# Patient Record
Sex: FEMALE | Race: WHITE | URBAN - METROPOLITAN AREA
[De-identification: names, ages, dates, MRNs, and addresses within clinical notes are randomized per-mention and may not be internally consistent; named-entity substitution may affect disease eponyms.]

---

## 2017-01-23 ENCOUNTER — THERAPY VISIT (OUTPATIENT)
Dept: PHYSICAL THERAPY | Facility: CLINIC | Age: 52
End: 2017-01-23
Payer: COMMERCIAL

## 2017-01-23 DIAGNOSIS — M25.512 ACUTE PAIN OF LEFT SHOULDER: ICD-10-CM

## 2017-01-23 DIAGNOSIS — S13.9XXA SPRAIN OF NECK, INITIAL ENCOUNTER: Primary | ICD-10-CM

## 2017-01-23 PROCEDURE — 97110 THERAPEUTIC EXERCISES: CPT | Mod: GP | Performed by: PHYSICAL THERAPIST

## 2017-01-23 PROCEDURE — 97140 MANUAL THERAPY 1/> REGIONS: CPT | Mod: GP | Performed by: PHYSICAL THERAPIST

## 2017-01-23 PROCEDURE — 97161 PT EVAL LOW COMPLEX 20 MIN: CPT | Mod: GP | Performed by: PHYSICAL THERAPIST
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
## 2017-02-01 PROBLEM — M25.512 ACUTE PAIN OF LEFT SHOULDER: Status: ACTIVE | Noted: 2017-02-01

## 2017-02-01 PROBLEM — S13.9XXA SPRAIN OF NECK, INITIAL ENCOUNTER: Status: ACTIVE | Noted: 2017-02-01

## 2017-02-01 ASSESSMENT — ENCOUNTER SYMPTOMS: NECK PAIN: 1
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
## 2017-02-03 ENCOUNTER — THERAPY VISIT (OUTPATIENT)
Dept: PHYSICAL THERAPY | Facility: CLINIC | Age: 52
End: 2017-02-03
Payer: COMMERCIAL

## 2017-02-03 DIAGNOSIS — S13.9XXA SPRAIN OF NECK, INITIAL ENCOUNTER: Primary | ICD-10-CM

## 2017-02-03 DIAGNOSIS — M25.512 ACUTE PAIN OF LEFT SHOULDER: ICD-10-CM

## 2017-02-03 PROCEDURE — 97140 MANUAL THERAPY 1/> REGIONS: CPT | Mod: GP | Performed by: PHYSICAL THERAPIST

## 2017-02-03 PROCEDURE — 97010 HOT OR COLD PACKS THERAPY: CPT | Mod: GP | Performed by: PHYSICAL THERAPIST

## 2017-02-03 PROCEDURE — 97110 THERAPEUTIC EXERCISES: CPT | Mod: GP | Performed by: PHYSICAL THERAPIST

## 2017-03-03 ENCOUNTER — THERAPY VISIT (OUTPATIENT)
Dept: PHYSICAL THERAPY | Facility: CLINIC | Age: 52
End: 2017-03-03
Payer: COMMERCIAL

## 2017-03-03 DIAGNOSIS — S13.9XXA SPRAIN OF NECK, INITIAL ENCOUNTER: Primary | ICD-10-CM

## 2017-03-03 DIAGNOSIS — M25.512 ACUTE PAIN OF LEFT SHOULDER: ICD-10-CM

## 2017-03-03 PROCEDURE — 97140 MANUAL THERAPY 1/> REGIONS: CPT | Mod: GP | Performed by: PHYSICAL THERAPIST

## 2017-03-03 PROCEDURE — 97112 NEUROMUSCULAR REEDUCATION: CPT | Mod: GP | Performed by: PHYSICAL THERAPIST

## 2017-03-03 PROCEDURE — 97110 THERAPEUTIC EXERCISES: CPT | Mod: GP | Performed by: PHYSICAL THERAPIST

## 2017-03-03 NOTE — LETTER
[facility]  [address]  [place] [address]  [phone]    April 10, 2017    Re: Dipti Chandler   :   1965  MRN:  [id number]   REFERRING PHYSICIAN:   [facility]    Date of Initial Evaluation:  17  Visits:  Rxs Used: 3  Reason for Referral:     Sprain of neck, initial encounter  Acute pain of left shoulder    PROGRESS  REPORT  Progress reporting period is from 17 to 3/3/17.     SUBJECTIVE  Subjective: Dipti notes that her L Shoulder is slightly improved without a painful arc, however, has a rough time sleeping on her L side at night. Also of note is L chronic UT pain that limits her neck mobility. Overall L shoulder pain is intermittant vs constant . Functionally is limited from sleeping through the night, laying on the L shoulder, lifting greater than 10#.    Current Pain level: 2/10   Initial Pain level: 9/10     Changes in function: Yes, see goal flow sheet for change in function   Adverse reactions: None    OBJECTIVE  Objective: Painful arc flexion is abolished, abduction roughly 130-140 degre arc of soreness. Palpation soreness to AC joint, cross body reach hurts the L shoulder. Primary complaint of L UT pain and tightness that sems to respond to Manual massage temporary. Recommend , assess global shoulder ROm every couple weeks, advance endurance strength fro RTC and scap.       ASSESSMENT/PLAN  Updated problem list and treatment plan: Diagnosis 1:  Neck pain     Diagnosis 2:  L shoulder ACJ Pain      STG/LTGs have been met or progress has been made towards goals:  Yes (See Goal flow sheet completed today.)  Assessment of Progress: The patient's condition is improving.  The patient's condition has potential to improve.  Self Management Plans:  Patient has been instructed in a home treatment program.    Recommendations:  This patient would benefit from continued therapy.     Frequency:  1-2 X a month, once daily  Duration:   for 3 months    Thank you for your referral.    INQUIRIES  Therapist: [facility]  [address]  [place] [address]  Phone: [phone]  Fax: [phone]

## 2017-04-09 NOTE — PROGRESS NOTES
Subjective:    HPI                    Objective:    System    Physical Exam    General     ROS    Assessment/Plan:      PROGRESS  REPORT    Progress reporting period is from 1/23/17 to 3/3/17.     SUBJECTIVE  Subjective: Dipti notes that her L Shoulder is slightly improved without a painful arc, however, has a rough time sleeping on her L side at night. Also of note is L chronic UT pain that limits her neck mobility. Overall L shoulder pain is intermittant vs constant . Functionally is limited from sleeping through the night, laying on the L shoulder, lifting greater than 10#.      Current Pain level: 2/10   Initial Pain level: 9/10     Changes in function: Yes, see goal flow sheet for change in function   Adverse reactions: None;   ,         OBJECTIVE  Objective: Painful arc flexion is abolished, abduction roughly 130-140 degre arc of soreness. Palpation soreness to AC joint, cross body reach hurts the L shoulder. Primary complaint of L UT pain and tightness that sems to respond to Manual massage temporary. Recommend , assess global shoulder ROm every couple weeks, advance endurance strength fro RTC and scap.       ASSESSMENT/PLAN  Updated problem list and treatment plan: Diagnosis 1:  Neck pain     Diagnosis 2:  L shoulder ACJ Pain      STG/LTGs have been met or progress has been made towards goals:  Yes (See Goal flow sheet completed today.)  Assessment of Progress: The patient's condition is improving.  The patient's condition has potential to improve.  Self Management Plans:  Patient has been instructed in a home treatment program.          Recommendations:  This patient would benefit from continued therapy.     Frequency:  1-2 X a month, once daily  Duration:  for 3 months        Please refer to the daily flowsheet for treatment today, total treatment time and time spent performing 1:1 timed codes.

## 2017-05-05 ENCOUNTER — THERAPY VISIT (OUTPATIENT)
Dept: PHYSICAL THERAPY | Facility: CLINIC | Age: 52
End: 2017-05-05
Payer: COMMERCIAL

## 2017-05-05 DIAGNOSIS — S13.9XXA SPRAIN OF NECK, INITIAL ENCOUNTER: ICD-10-CM

## 2017-05-05 DIAGNOSIS — M25.512 ACUTE PAIN OF LEFT SHOULDER: ICD-10-CM

## 2017-05-05 PROCEDURE — 97110 THERAPEUTIC EXERCISES: CPT | Mod: GP | Performed by: PHYSICAL THERAPIST

## 2017-05-05 PROCEDURE — 97140 MANUAL THERAPY 1/> REGIONS: CPT | Mod: GP | Performed by: PHYSICAL THERAPIST

## 2017-05-05 PROCEDURE — 97112 NEUROMUSCULAR REEDUCATION: CPT | Mod: GP | Performed by: PHYSICAL THERAPIST
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
## 2017-09-16 PROBLEM — M25.512 ACUTE PAIN OF LEFT SHOULDER: Status: RESOLVED | Noted: 2017-02-01 | Resolved: 2017-09-16

## 2017-09-16 PROBLEM — S13.9XXA SPRAIN OF NECK, INITIAL ENCOUNTER: Status: RESOLVED | Noted: 2017-02-01 | Resolved: 2017-09-16

## 2017-12-11 ENCOUNTER — THERAPY VISIT (OUTPATIENT)
Dept: OCCUPATIONAL THERAPY | Facility: CLINIC | Age: 52
End: 2017-12-11
Payer: COMMERCIAL

## 2017-12-11 DIAGNOSIS — G56.23 ULNAR NEUROPATHY OF BOTH UPPER EXTREMITIES: ICD-10-CM

## 2017-12-11 DIAGNOSIS — R20.2 PARESTHESIA OF BOTH HANDS: Primary | ICD-10-CM

## 2017-12-11 PROCEDURE — 97535 SELF CARE MNGMENT TRAINING: CPT | Mod: GO | Performed by: OCCUPATIONAL THERAPIST

## 2017-12-11 PROCEDURE — 97112 NEUROMUSCULAR REEDUCATION: CPT | Mod: GO | Performed by: OCCUPATIONAL THERAPIST

## 2017-12-11 PROCEDURE — 29105 APPLICATION LONG ARM SPLINT: CPT | Mod: GO | Performed by: OCCUPATIONAL THERAPIST

## 2017-12-11 PROCEDURE — 97165 OT EVAL LOW COMPLEX 30 MIN: CPT | Mod: GO | Performed by: OCCUPATIONAL THERAPIST

## 2017-12-11 NOTE — MR AVS SNAPSHOT
"              After Visit Summary   12/11/2017    Dipti Chandler    MRN: [id number]           Patient Information     Date Of Birth          1965        Visit Information        Provider Department      12/11/2017 11:30 AM [facility] [place]        Today's Diagnoses     Paresthesia of both hands    -  1    Ulnar neuropathy of both upper extremities           Follow-ups after your visit        Your next 10 appointments already scheduled     Feb 13, 2018  8:30 AM CST   (Arrive by 8:15 AM)   Return Visit with Jennifer Dickson MD   [facility] ([facility] [place])    [address] [place] [address] [phone]              Who to contact     If you have questions or need follow up information about today's clinic visit or your schedule please contact [facility] [place] directly at [phone].  Normal or non-critical lab and imaging results will be communicated to you by MyChart, letter or phone within 4 business days after the clinic has received the results. If you do not hear from us within 7 days, please contact the clinic through MyChart or phone. If you have a critical or abnormal lab result, we will notify you by phone as soon as possible.  Submit refill requests through [employer] or call your pharmacy and they will forward the refill request to us. Please allow 3 business days for your refill to be completed.          Additional Information About Your Visit        [employer] Information     [employer] lets you send messages to your doctor, view your test results, renew your prescriptions, schedule appointments and more. To sign up, go to [email or web address] . Click on \"Log in\" on the left side of the screen, which will take you to the Welcome page. Then click on \"Sign up Now\" on the right side of the page.     You will be asked to enter the access code listed " below, as well as some personal information. Please follow the directions to create your username and password.     Your access code is: [email or web address]  Expires: 3/11/2018 12:41 PM     Your access code will  in 90 days. If you need help or a new code, please call your [facility] clinic or [phone].        Care EveryWhere ID     This is your Care EveryWhere ID. This could be used by other organizations to access your [facility] medical records  [id number]         Blood Pressure from Last 3 Encounters:   No data found for BP    Weight from Last 3 Encounters:   No data found for Wt              We Performed the Following     APPLY LONG ARM SPLINT     HC OT EVAL, LOW COMPLEXITY     JOSE INITIAL EVAL REPORT     NEUROMUSCULAR RE-EDUCATION     SELF CARE MNGMENT TRAINING        Primary Care Provider Office Phone # Fax #    Andrey Alvarez [phone]       [facility] [address]  [facility] [address]        Equal Access to Services     MIRZA JERRY : Hadii aad ku hadasho Soomaali, waaxda luqadaha, qaybta kaalmada adeegyada, waxay idiin hayyonisn kasi landry . So [facility] [phone].    ATENCIÓN: Si habla español, tiene a dial disposición servicios gratuitos de asistencia lingüística. Llame al [phone].    We comply with applicable federal civil rights laws and [place] laws. We do not discriminate on the basis of race, color, national origin, age, disability, sex, sexual orientation, or gender identity.            Thank you!     Thank you for choosing [facility] [place] [facility]  for your care. Our goal is always to provide you with excellent care. Hearing back from our patients is one way we can continue to improve our services. Please take a few minutes to complete the written survey that you may receive in the mail after your visit with us. Thank you!             Your Updated Medication List - Protect others around you: Learn how to safely use,  store and throw away your medicines at www.disposemymeds.org.          This list is accurate as of: 12/11/17 12:41 PM.  Always use your most recent med list.                   Brand Name Dispense Instructions for use Diagnosis    ADVIL 200 MG tablet   Generic drug:  ibuprofen      Take 200 mg by mouth every 4 hours as needed.        ALLEGRA 180 MG tablet   Generic drug:  fexofenadine      Take 180 mg by mouth daily.        AMBIEN 5 MG tablet   Generic drug:  zolpidem      Take 5 mg by mouth nightly as needed.        fish oil-omega-3 fatty acids 1000 MG capsule      Take 21 g by mouth daily        FROVA 2.5 MG tablet   Generic drug:  frovatriptan      Take 2.5 mg by mouth at onset of headache.        methylphenidate 10 MG Cp24    RITALIN LA     Take 10 mg by mouth daily        METROGEL 1 % gel   Generic drug:  metroNIDAZOLE      Apply 0.5 inches topically daily. Apply and rub in a thin film to affected areas twice daily        nexIUM 20 MG CR capsule   Generic drug:  esomeprazole      Take 20 mg by mouth every morning (before breakfast). One hour before meals        PILOCARPINE HCL PO      Take 4 mg by mouth 3 times daily        ranitidine 75 MG tablet   Generic drug:  ranitidine      Take 75 mg by mouth 2 times daily.        RESTASIS 0.05 % ophthalmic emulsion   Generic drug:  cycloSPORINE      Apply 1 drop to eye every 12 hours.        Suvorexant 5 MG tablet    BELSOMRA     Take 10 mg by mouth nightly as needed        TYLENOL 8 HOUR PO      Take  by mouth.

## 2017-12-11 NOTE — LETTER
[facility] [place]  [address]  [place] [address]  [phone]    2017    Re: Dipti Chandler   :   1965  MRN:  [id number]   REFERRING PHYSICIAN:   Denny Wharton    [facility] [place]    Date of Initial Evaluation: 17  Visits:  Rxs Used: 1  Reason for Referral:     Paresthesia of both hands  Ulnar neuropathy of both upper extremities    EVALUATION SUMMARY    Hand Therapy Initial Evaluation    Current Date:  2017    Subjective:  Dipti Chandler is a 52 year old right hand dominant female Diagnosis:   Bilateral ulnar neuropathy at the elbows DOI:  17 (therapy referral)Patient reports symptoms of pain, weakness/loss of strength, numbness and tingling of bilateral hands and elbows which began 3 months ago due to recent increase in exercise for weight loss.  Had similar symptoms 12 years ago that resolved with ergonomic changes. Since onset symptoms are gradually getting worse.  Special tests:  EMG left only.  Previous treatment: Had splints 12 years ago that need to be replaced.  General health as reported by patient is excellent.  Pertinent medical history includes:weakness  Medical allergies: Vicodin, oxycodone.  Surgical history: orthopedic: L ankle, L shoulder, other: oophorectomy, cyst removal.  Medication history: hormone replacement, anti-inflammatory.    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  no intrinsic atrophy and no clawing of ulnar  digits.    Strength:   (Measured in pounds)   17    Trials Right Left   1  2  3 60  52  50 45  48  40   Average: 54 44     Dipti Chandler   :   1965        Lat Pinch 17    Trials Right Left   1  2  3 15  16  16 16  17  17   Average: 16 17     3 Pt Pinch 17    Trials Right Left   1  2  3 17  15  15 15  16  16   Average: 16 16     Sensation:  Decreased Ulnar Nerve distribution     Special Tests:  Pain Report:  - none    + mild    ++ moderate    +++ severe    17   Tinels at cubital tunnel R:+  L:++   Elbow Flexion Test R:-  L:-   Ulnar nerve subluxation at elbow R:-  L:-     Pain Report:  VAS(0-10) 17   At Rest: 0/10   With Use: 1/10   Location:  elbow  Pain Quality:  Tender  Frequency: intermittent    Pain is worst:  daytime  Exacerbated by:  Lean on elbow   Relieved by:  rest  Progression:  Unchanged  Assessment:  Patient presents with symptoms consistent with diagnosis of bilateral cubital tunnel, with conservative intervention.     Patient's limitations or Problem List includes:  Pain, Weakness, Sensory disturbance, Decreased  and Decreased pinch of bilateral elbows and hands which interferes with the patient's ability to perform Self Care Tasks (dressing), Sleep Patterns, Household Chores and Driving  as compared to previous level of function.    Rehab Potential:  Excellent - Return to full activity, no limitations    Patient will benefit from skilled Occupational Therapy to increase overall strength,  strength, pinch strength and sensation and decrease pain to return to previous activity level and resume normal daily tasks and to reach their rehab potential.    Barriers to Learning:  No barrier    Communication Issues:  Patient appears to be able to clearly communicate and understand verbal and written communication and follow directions correctly.    Chart Review: Simple history review with patient  Dipti Chandler   :   1965      Identified Performance  Deficits: dressing, driving and community mobility, home establishment and management, meal preparation and cleanup and sleep    Assessment of Occupational Performance:  5 or more Performance Deficits     Clinical Decision Making (Complexity): Low complexity    Treatment Explanation:  The following has been discussed with the patient:  RX ordered/plan of care  Anticipated outcomes  Possible risks and side effects    Plan:  Frequency:  1 X week, once daily  Duration:  for 1 week (NA, one time visit)    Treatment Plan:    Therapeutic Exercise:  AROM  Neuromuscular re-education:  Nerve Gliding, Posture and Kinesiotaping  Orthotic Fabrication:  Elbow  based orthosis  Self Care:  Self Care Tasks and Ergonomic Considerations    Discharge Plan:  Achieve all LTG.  Independent in home treatment program.  Reach maximal therapeutic benefit.    Home Exercise Program:  Postural awareness, avoid rounded shoulders  Diagnostic education for protection of ulnar nerve  Avoid prolonged elbow flexion and leaning on elbow  Elbow pad prn day  Elbow pad and flexion block orthoses sleeping  Chin tucks and shoulder blade squeezes  Pec stretches  Ulnar nerve flossing    Thank you for your referral.    INQUIRIES  Therapist: RESHMA Carson/FELA, CHT  [facility] [place]  [address]  [facility] [address]  Phone: [phone]  Fax: [phone]

## 2017-12-11 NOTE — PROGRESS NOTES
Hand Therapy Initial Evaluation    Current Date:  12/11/2017    Subjective:  Dipti Chandler is a 52 year old right hand dominant female.    Diagnosis:   Bilateral ulnar neuropathy at the elbows  DOI:  11/21/17 (therapy referral)    Patient reports symptoms of pain, weakness/loss of strength, numbness and tingling of bilateral hands and elbows which began 3 months ago due to recent increase in exercise for weight loss.  Had similar symptoms 12 years ago that resolved with ergonomic changes. Since onset symptoms are gradually getting worse.  Special tests:  EMG left only.  Previous treatment: Had splints 12 years ago that need to be replaced.  General health as reported by patient is excellent.  Pertinent medical history includes:weakness  Medical allergies: Vicodin, oxycodone.  Surgical history: orthopedic: L ankle, L shoulder, other: oophorectomy, cyst removal.  Medication history: hormone replacement, anti-inflammatory.    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  no intrinsic atrophy and no clawing of ulnar digits.    Strength:   (Measured in pounds)   12/11/17    Trials Right Left   1  2  3 60  52  50 45  48  40   Average: 54 44     Lat Pinch 12/11/17    Trials Right Left   1  2  3 15  16  16 16  17  17   Average: 16 17     3 Pt Pinch 12/11/17    Trials Right Left   1  2  3 17  15  15 15  16  16   Average: 16 16     Sensation:  Decreased Ulnar Nerve distribution     Special Tests:  Pain Report:  - none    + mild    ++ moderate    +++ severe    12/11/17   Tinels at cubital  tunnel R:+  L:++   Elbow Flexion Test R:-  L:-   Ulnar nerve subluxation at elbow R:-  L:-     Pain Report:  VAS(0-10) 12/11/17   At Rest: 0/10   With Use: 1/10   Location:  elbow  Pain Quality:  Tender  Frequency: intermittent    Pain is worst:  daytime  Exacerbated by:  Lean on elbow   Relieved by:  rest  Progression:  Unchanged  Assessment:  Patient presents with symptoms consistent with diagnosis of bilateral cubital tunnel, with conservative intervention.     Patient's limitations or Problem List includes:  Pain, Weakness, Sensory disturbance, Decreased  and Decreased pinch of bilateral elbows and hands which interferes with the patient's ability to perform Self Care Tasks (dressing), Sleep Patterns, Household Chores and Driving  as compared to previous level of function.    Rehab Potential:  Excellent - Return to full activity, no limitations    Patient will benefit from skilled Occupational Therapy to increase overall strength,  strength, pinch strength and sensation and decrease pain to return to previous activity level and resume normal daily tasks and to reach their rehab potential.    Barriers to Learning:  No barrier    Communication Issues:  Patient appears to be able to clearly communicate and understand verbal and written communication and follow directions correctly.    Chart Review: Simple history review with patient    Identified Performance Deficits: dressing, driving and community mobility, home establishment and management, meal preparation and cleanup and sleep    Assessment of Occupational Performance:  5 or more Performance Deficits     Clinical Decision Making (Complexity): Low complexity    Treatment Explanation:  The following has been discussed with the patient:  RX ordered/plan of care  Anticipated outcomes  Possible risks and side effects    Plan:  Frequency:  1 X week, once daily  Duration:  for 1 week (NA, one time visit)    Treatment Plan:    Therapeutic Exercise:   AROM  Neuromuscular re-education:  Nerve Gliding, Posture and Kinesiotaping  Orthotic Fabrication:  Elbow  based orthosis  Self Care:  Self Care Tasks and Ergonomic Considerations    Discharge Plan:  Achieve all LTG.  Independent in home treatment program.  Reach maximal therapeutic benefit.    Home Exercise Program:  Postural awareness, avoid rounded shoulders  Diagnostic education for protection of ulnar nerve  Avoid prolonged elbow flexion and leaning on elbow  Elbow pad prn day  Elbow pad and flexion block orthoses sleeping  Chin tucks and shoulder blade squeezes  Pec stretches  Ulnar nerve flossing

## 2018-02-13 ENCOUNTER — OFFICE VISIT (OUTPATIENT)
Dept: DERMATOLOGY | Facility: CLINIC | Age: 53
End: 2018-02-13
Payer: COMMERCIAL

## 2018-02-13 DIAGNOSIS — L64.9 ANDROGENETIC ALOPECIA: ICD-10-CM

## 2018-02-13 DIAGNOSIS — D18.01 CHERRY ANGIOMA: ICD-10-CM

## 2018-02-13 DIAGNOSIS — D22.9 MULTIPLE BENIGN MELANOCYTIC NEVI: Primary | ICD-10-CM

## 2018-02-13 DIAGNOSIS — Z86.018 H/O DYSPLASTIC NEVUS: ICD-10-CM

## 2018-02-13 ASSESSMENT — PAIN SCALES - GENERAL: PAINLEVEL: NO PAIN (0)

## 2018-02-13 NOTE — PROGRESS NOTES
[facility] Dermatology Note      Dermatology Problem List:  1.Dysplastic nevi. Multiple; mild-moderate.   2. AGA; laser comb/minoxidil 5%    Encounter Date: Feb 13, 2018    CC:   Chief Complaint   Patient presents with     Skin Check     Dipti is here for a skin check, there are no spots that are bothering her at this time.         History of Present Illness:  Ms. Dipit Chandler is a 52 year old female who presents today today in f/u for dysplastic nevi. Last seen 11/23/16, at which point she had a benign examination. She notes no changing or concerning moles. She has had stable hairloss. Using laser comb TIW. Using Minoxidil TIW as well as more frequent use is irritating to the scalp. No other skin issues or concerns today.       Past Medical History:   Patient Active Problem List   Diagnosis     Atypical nevi     CARDIOVASCULAR SCREENING; LDL GOAL LESS THAN 160     Skin exam, screening for cancer     Other postprocedural status(V45.89)     History reviewed. No pertinent past medical history.  Past Surgical History:   Procedure Laterality Date     BIOPSY OF SKIN LESION         Social History:  The patient grew up in [facility] [place] [facility]. Significant sun exposure hx; blistering sun burns.   Denies tanning bed use.       Medications:  Current Outpatient Prescriptions   Medication Sig Dispense Refill     methylphenidate (RITALIN LA) 10 MG CP24 Take 10 mg by mouth daily       Suvorexant (BELSOMRA) 5 MG tablet Take 10 mg by mouth nightly as needed       PILOCARPINE HCL PO Take 4 mg by mouth 3 times daily       fish oil-omega-3 fatty acids (FISH OIL) 1000 MG capsule Take 21 g by mouth daily       cycloSPORINE (RESTASIS) 0.05 % ophthalmic emulsion Apply 1 drop to eye every 12 hours.       ibuprofen (ADVIL) 200 MG tablet Take 200 mg by mouth every 4 hours as needed.       frovatriptan (FROVA) 2.5 MG tablet Take 2.5 mg by mouth at onset of headache.       MetroNIDazole (METROGEL) 1 % gel Apply 0.5 inches topically  daily. Apply and rub in a thin film to affected areas twice daily       esomeprazole (NEXIUM) 20 MG capsule Take 20 mg by mouth every morning (before breakfast). One hour before meals       Acetaminophen (TYLENOL 8 HOUR PO) Take  by mouth.       ranitidine (ZANTAC 75) 75 MG tablet Take 75 mg by mouth 2 times daily.       zolpidem (AMBIEN) 5 MG tablet Take 5 mg by mouth nightly as needed.       fexofenadine (ALLEGRA) 180 MG tablet Take 180 mg by mouth daily.          No Known Allergies      Review of Systems:  -As per HPI  -Constitutional: The patient denies fatigue, fevers, chills, and night sweats.   -HEENT: Patient denies additional nonhealing oral sores.  -Skin: As above in HPI. No additional skin concerns.    Physical exam:  Vitals: There were no vitals taken for this visit.  GEN: Well appearing, well nourished. No acute distress. Alert and oriented x3.   SKIN: Full skin, which includes the head/face, both arms, chest, back, abdomen,both legs, genitalia and/or groin buttocks, digits and/or nails, was examined.  -There are dome shaped bright red papules on the face trunk and extremities.   -Multiple regular brown pigmented macules and papules are identified on the face, trunk and extremities. All <5 mm. Reticular, benign networks.   -Well healed scars at previous biopsy sites.   -1.2 part width. No scale or erythema.   -Mild PIH on the left popliteal fossa.   -No other lesions of concern on areas examined.     Impression/Plan:  1.History of dysplastic nevi. Multiple benign nevi on exam today. NERD at previous biopsy sites. Reassurance provided/   -1 year FBSE advised. Sooner with changes.   -ABCDs of melanoma were discussed and self skin checks were advised. , Sun precaution was advised including the use of sun screens of SPF 30 or higher, sun protective clothing, and avoidance of tanning beds.    2. Cherry angiomata.   -Benign, reassurance. Nothing to do.     3. Androgenetic Alopecia. Stable from previous. No  scarring on examination.   -Continue with photo biomodulation TIW and minoxidil 5% foam.       Follow-up in 1 year, earlier for new or changing lesions.       Dr. Dickson staffed the patient.    Staff Involved:  Resident(José Luis Foreman)/Staff(as above)    José Luis Foreman MD  PGY3 Dermatology  [phone]   .I, Jennifer Dickson MD, saw this patient with the resident and agree with the resident s findings and plan of care as documented in the resident s note.

## 2018-02-13 NOTE — LETTER
2/13/2018       RE: Dipti Chandler  [address] [address]     Dear Colleague,    Thank you for referring your patient, Dipti Chandler, to the [facility] DERMATOLOGY at [facility]. Please see a copy of my visit note below.    [facility] Dermatology Note      Dermatology Problem List:  1.Dysplastic nevi. Multiple; mild-moderate.   2. AGA; laser comb/minoxidil 5%    Encounter Date: Feb 13, 2018    CC:   Chief Complaint   Patient presents with     Skin Check     Dipti is here for a skin check, there are no spots that are bothering her at this time.         History of Present Illness:  Ms. Dipti Chandler is a 52 year old female who presents today today in f/u for dysplastic nevi. Last seen 11/23/16, at which point she had a benign examination. She notes no changing or concerning moles. She has had stable hairloss. Using laser comb TIW. Using Minoxidil TIW as well as more frequent use is irritating to the scalp. No other skin issues or concerns today.       Past Medical History:   Patient Active Problem List   Diagnosis     Atypical nevi     CARDIOVASCULAR SCREENING; LDL GOAL LESS THAN 160     Skin exam, screening for cancer     Other postprocedural status(V45.89)     History reviewed. No pertinent past medical history.  Past Surgical History:   Procedure Laterality Date     BIOPSY OF SKIN LESION         Social History:  The patient grew up in [place] [facility]. Significant sun exposure hx; blistering sun burns.   Denies tanning bed use.       Medications:  Current Outpatient Prescriptions   Medication Sig Dispense Refill     methylphenidate (RITALIN LA) 10 MG CP24 Take 10 mg by mouth daily       Suvorexant (BELSOMRA) 5 MG tablet Take 10 mg by mouth nightly as needed       PILOCARPINE HCL PO Take 4 mg by mouth 3 times daily       fish oil-omega-3 fatty acids (FISH OIL) 1000 MG capsule Take 21 g by mouth daily       cycloSPORINE (RESTASIS) 0.05 %  ophthalmic emulsion Apply 1 drop to eye every 12 hours.       ibuprofen (ADVIL) 200 MG tablet Take 200 mg by mouth every 4 hours as needed.       frovatriptan (FROVA) 2.5 MG tablet Take 2.5 mg by mouth at onset of headache.       MetroNIDazole (METROGEL) 1 % gel Apply 0.5 inches topically daily. Apply and rub in a thin film to affected areas twice daily       esomeprazole (NEXIUM) 20 MG capsule Take 20 mg by mouth every morning (before breakfast). One hour before meals       Acetaminophen (TYLENOL 8 HOUR PO) Take  by mouth.       ranitidine (ZANTAC 75) 75 MG tablet Take 75 mg by mouth 2 times daily.       zolpidem (AMBIEN) 5 MG tablet Take 5 mg by mouth nightly as needed.       fexofenadine (ALLEGRA) 180 MG tablet Take 180 mg by mouth daily.          No Known Allergies      Review of Systems:  -As per HPI  -Constitutional: The patient denies fatigue, fevers, chills, and night sweats.   -HEENT: Patient denies additional nonhealing oral sores.  -Skin: As above in HPI. No additional skin concerns.    Physical exam:  Vitals: There were no vitals taken for this visit.  GEN: Well appearing, well nourished. No acute distress. Alert and oriented x3.   SKIN: Full skin, which includes the head/face, both arms, chest, back, abdomen,both legs, genitalia and/or groin buttocks, digits and/or nails, was examined.  -There are dome shaped bright red papules on the face trunk and extremities.   -Multiple regular brown pigmented macules and papules are identified on the face, trunk and extremities. All <5 mm. Reticular, benign networks.   -Well healed scars at previous biopsy sites.   -1.2 part width. No scale or erythema.   -Mild PIH on the left popliteal fossa.   -No other lesions of concern on areas examined.     Impression/Plan:  1.History of dysplastic nevi. Multiple benign nevi on exam today. NERD at previous biopsy sites. Reassurance provided/   -1 year FBSE advised. Sooner with changes.   -ABCDs of melanoma were discussed and  self skin checks were advised. , Sun precaution was advised including the use of sun screens of SPF 30 or higher, sun protective clothing, and avoidance of tanning beds.    2. Cherry angiomata.   -Benign, reassurance. Nothing to do.     3. Androgenetic Alopecia. Stable from previous. No scarring on examination.   -Continue with photo biomodulation TIW and minoxidil 5% foam.       Follow-up in 1 year, earlier for new or changing lesions.       Dr. Dickson staffed the patient.    Staff Involved:  Resident(José Luis Foreman)/Staff(as above)    José Luis Foreman MD  PGY3 Dermatology  [phone]   .I, Jennifer Dickson MD, saw this patient with the resident and agree with the resident s findings and plan of care as documented in the resident s note.

## 2018-02-13 NOTE — NURSING NOTE
Dermatology Rooming Note    Dipti Chandler's goals for this visit include:   Chief Complaint   Patient presents with     Skin Check     Dipti is here for a skin check, there are no spots that are bothering her at this time.     Beth Georges CMA

## 2018-02-13 NOTE — MR AVS SNAPSHOT
After Visit Summary   2/13/2018    Dipti Chandler    MRN: [id number]           Patient Information     Date Of Birth          1965        Visit Information        Provider Department      2/13/2018 8:30 AM Jennifer Dickson MD [facility] Dermatology        Today's Diagnoses     Multiple benign melanocytic nevi    -  1    H/O dysplastic nevus        Cherry angioma        Androgenetic alopecia          Care Instructions        The ABCDEs of Melanoma    Skin cancer can develop anywhere on the skin. Ask someone for help when checking your skin, especially in hard to see places. If you notice a mole different from others, or that changes, enlarges, itches, or bleeds (even if it is small), you should see a dermatologist.                              Follow-ups after your visit        Who to contact     Please call your clinic at [phone] to:    Ask questions about your health    Make or cancel appointments    Discuss your medicines    Learn about your test results    Speak to your doctor            Additional Information About Your Visit        MyChart Information     [employer] gives you secure access to your electronic health record. If you see a primary care provider, you can also send messages to your care team and make appointments. If you have questions, please call your primary care clinic.  If you do not have a primary care provider, please call [phone] and they will assist you.      [employer] is an electronic gateway that provides easy, online access to your medical records. With [employer], you can request a clinic appointment, read your test results, renew a prescription or communicate with your care team.     To access your existing account, please contact your [facility] or call [phone] for assistance.        Care EveryWhere ID     This is your Care EveryWhere ID. This could be used by other organizations to access your [facility]  records  [id number]         Blood Pressure from Last 3 Encounters:   No data found for BP    Weight from Last 3 Encounters:   No data found for Wt              Today, you had the following     No orders found for display       Primary Care Provider Office Phone # Fax #    Andrey Alvarez [phone]       [facility] [address]  [facility] [address]        Equal Access to Services     YAMILE JERRY : Hadii aad ku hadasho Soomaali, waaxda luqadaha, qaybta kaalmada adeegyada, waxay idiin hayaan adegladys sandhu laJohnnyaan . So [facility] [phone].    ATENCIÓN: Si habla español, tiene a dial disposición servicios gratuitos de asistencia lingüística. Eduardo al [phone].    We comply with applicable federal civil rights laws and [place] laws. We do not discriminate on the basis of race, color, national origin, age, disability, sex, sexual orientation, or gender identity.            Thank you!     Thank you for choosing [facility] DERMAT[facility]  for your care. Our goal is always to provide you with excellent care. Hearing back from our patients is one way we can continue to improve our services. Please take a few minutes to complete the written survey that you may receive in the mail after your visit with us. Thank you!             Your Updated Medication List - Protect others around you: Learn how to safely use, store and throw away your medicines at www.disposemymeds.org.          This list is accurate as of 2/13/18  4:57 PM.  Always use your most recent med list.                   Brand Name Dispense Instructions for use Diagnosis    ADVIL 200 MG tablet   Generic drug:  ibuprofen      Take 200 mg by mouth every 4 hours as needed.        ALLEGRA 180 MG tablet   Generic drug:  fexofenadine      Take 180 mg by mouth daily.        AMBIEN 5 MG tablet   Generic drug:  zolpidem      Take 5 mg by mouth nightly as needed.        fish oil-omega-3 fatty acids 1000 MG capsule      Take 21 g by mouth  daily        FROVA 2.5 MG tablet   Generic drug:  frovatriptan      Take 2.5 mg by mouth at onset of headache.        methylphenidate 10 MG Cp24    RITALIN LA     Take 10 mg by mouth daily        METROGEL 1 % gel   Generic drug:  metroNIDAZOLE      Apply 0.5 inches topically daily. Apply and rub in a thin film to affected areas twice daily        nexIUM 20 MG CR capsule   Generic drug:  esomeprazole      Take 20 mg by mouth every morning (before breakfast). One hour before meals        PILOCARPINE HCL PO      Take 4 mg by mouth 3 times daily        ranitidine 75 MG tablet   Generic drug:  ranitidine      Take 75 mg by mouth 2 times daily.        RESTASIS 0.05 % ophthalmic emulsion   Generic drug:  cycloSPORINE      Apply 1 drop to eye every 12 hours.        Suvorexant 5 MG tablet    BELSOMRA     Take 10 mg by mouth nightly as needed        TYLENOL 8 HOUR PO      Take  by mouth.

## 2018-02-13 NOTE — PATIENT INSTRUCTIONS
The ABCDEs of Melanoma    Skin cancer can develop anywhere on the skin. Ask someone for help when checking your skin, especially in hard to see places. If you notice a mole different from others, or that changes, enlarges, itches, or bleeds (even if it is small), you should see a dermatologist.

## 2018-02-17 ENCOUNTER — HEALTH MAINTENANCE LETTER (OUTPATIENT)
Age: 53
End: 2018-02-17

## 2018-05-19 ENCOUNTER — TRANSFERRED RECORDS (OUTPATIENT)
Dept: HEALTH INFORMATION MANAGEMENT | Facility: CLINIC | Age: 53
End: 2018-05-19

## 2018-09-25 ENCOUNTER — TELEPHONE (OUTPATIENT)
Dept: DERMATOLOGY | Facility: CLINIC | Age: 53
End: 2018-09-25

## 2018-09-25 NOTE — TELEPHONE ENCOUNTER
Health Call Center    Phone Message    May a detailed message be left on voicemail: yes    Reason for Call: Other: Pt has none healing lesion on tip of nose and would like to be schedule with Dr. Dickson as soon as possible. Unable to schedule, so sending message per request of Pt's nurse Crystal.  Did offer another provider, but would prefer to be seen by Dr. Dickson.     Action Taken: Message routed to:  [facility] Dermatology Clinic

## 2018-09-26 NOTE — TELEPHONE ENCOUNTER
Not able to leave patient a voicemail.   Please see notes below from clinical team.   Patient may schedule with another provider.

## 2018-10-03 NOTE — TELEPHONE ENCOUNTER
Left voicemail saying that Dr. Dickson is okay with patient seeing another provider to be seen sooner for her area of concern. Clinic number left in message too.

## 2018-10-31 ENCOUNTER — THERAPY VISIT (OUTPATIENT)
Dept: PHYSICAL THERAPY | Facility: CLINIC | Age: 53
End: 2018-10-31
Payer: COMMERCIAL

## 2018-10-31 DIAGNOSIS — M54.2 NECK PAIN: Primary | ICD-10-CM

## 2018-10-31 PROCEDURE — 97110 THERAPEUTIC EXERCISES: CPT | Mod: GP | Performed by: PHYSICAL THERAPIST

## 2018-10-31 PROCEDURE — 97140 MANUAL THERAPY 1/> REGIONS: CPT | Mod: GP | Performed by: PHYSICAL THERAPIST

## 2018-10-31 PROCEDURE — 97161 PT EVAL LOW COMPLEX 20 MIN: CPT | Mod: GP | Performed by: PHYSICAL THERAPIST

## 2018-10-31 PROCEDURE — 97035 APP MDLTY 1+ULTRASOUND EA 15: CPT | Mod: GP | Performed by: PHYSICAL THERAPIST

## 2018-10-31 ASSESSMENT — ACTIVITIES OF DAILY LIVING (ADL)
WEAKNESS: I DO NOT HAVE THE SYMPTOM
GIVING WAY, BUCKLING OR SHIFTING OF KNEE: I DO NOT HAVE THE SYMPTOM
SQUAT: ACTIVITY IS NOT DIFFICULT
STAND: ACTIVITY IS NOT DIFFICULT
GO UP STAIRS: ACTIVITY IS SOMEWHAT DIFFICULT
WALK: ACTIVITY IS MINIMALLY DIFFICULT
STIFFNESS: THE SYMPTOM AFFECTS MY ACTIVITY SLIGHTLY
HOW_WOULD_YOU_RATE_THE_OVERALL_FUNCTION_OF_YOUR_KNEE_DURING_YOUR_USUAL_DAILY_ACTIVITIES?: ABNORMAL
RAW_SCORE: 60
RISE FROM A CHAIR: ACTIVITY IS NOT DIFFICULT
KNEEL ON THE FRONT OF YOUR KNEE: ACTIVITY IS NOT DIFFICULT
AS_A_RESULT_OF_YOUR_KNEE_INJURY,_HOW_WOULD_YOU_RATE_YOUR_CURRENT_LEVEL_OF_DAILY_ACTIVITY?: ABNORMAL
HOW_WOULD_YOU_RATE_THE_CURRENT_FUNCTION_OF_YOUR_KNEE_DURING_YOUR_USUAL_DAILY_ACTIVITIES_ON_A_SCALE_FROM_0_TO_100_WITH_100_BEING_YOUR_LEVEL_OF_KNEE_FUNCTION_PRIOR_TO_YOUR_INJURY_AND_0_BEING_THE_INABILITY_TO_PERFORM_ANY_OF_YOUR_USUAL_DAILY_ACTIVITIES?: 75
KNEE_ACTIVITY_OF_DAILY_LIVING_SUM: 60
PAIN: THE SYMPTOM AFFECTS MY ACTIVITY MODERATELY
KNEE_ACTIVITY_OF_DAILY_LIVING_SCORE: 85.71
SIT WITH YOUR KNEE BENT: ACTIVITY IS NOT DIFFICULT
GO DOWN STAIRS: ACTIVITY IS SOMEWHAT DIFFICULT
SWELLING: I DO NOT HAVE THE SYMPTOM
LIMPING: I DO NOT HAVE THE SYMPTOM

## 2018-10-31 NOTE — LETTER
[facility]  [address]  [place] [address]  [phone]    2018    Re: Dipti Chandler   :   1965  MRN:  [id number]   REFERRING PHYSICIAN:   [facility]    Date of Initial Evaluation:  10/31/18  Visits:  Rxs Used: 1  Reason for Referral:  Neck pain    [facility] Initial Evaluation    Subjective:  HPI Comments: [facility]    Patient is a 53 year old female presenting with rehab cervical spine hpi. The history is provided by the patient. No  was used.   Dipti Chandler is a 53 year old female with a cervical spine condition.  Condition occurred with:  Degenerative joint disease and repetition/overuse.  Condition occurred: for unknown reasons.  This is a recurrent condition  10/22/18.    Patient reports pain:  Cervical left side.    Pain is described as aching and sharp and is intermittent and reported as 3/10.  Associated symptoms:  Loss of motion/stiffness. Pain is the same all the time.  Symptoms are exacerbated by carrying, lifting, rotating head, looking up or down, stress and sitting and relieved by heat, muscle relaxants and other (injection therapy ).  Since onset symptoms are unchanged.  Special tests:  X-ray.  Previous treatment includes physical therapy.  There was moderate improvement following previous treatment.  General health as reported by patient is excellent.  Pertinent medical history includes:  Numbness/tingling and migraines/headaches.  Medical allergies: no.  Other surgeries include:  Orthopedic surgery (L ankle and L shoulder surgeries).  Current medications:  Anti-inflammatory and sleep medication (hormone replacement ).  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  Primary job tasks include:  Repetitive tasks, prolonged sitting and prolonged standing (computer ).  Barriers include:  None as reported by the patient.  Red flags:   None as reported by the patient.  Knee Activity of Daily Living Score: 85.71            Objective:  Standing Alignment:    Cervical/Thoracic:  Forward head and thoracic kyphosis increased  Shoulder/UE:  Rounded shoulders    Gait:  L knee pain   Gait Type:  Antalgic   Weight Bearing Status:  WBAT   Assistive Devices:  None  Flexibility/Screens:   Positive screens:  Cervical and Knee  Neurological: She is alert. She has normal reflexes. No cranial nerve deficit or sensory deficit.     Cervical/Thoracic Evaluation    AROM:  AROM Cervical:  Flexion:          ERP , limited ROM, central cx spine and UT  Extension:       Moderate limitation, ERP, central cx spine   Rotation:         Left: ERP , L UT, referred sx's to L hand      Right: ERP, L UT and referred sx's to L hand   Side Bend:      Left:     Right:     Headaches: cervical    Cervical Myotomes:  normal    DTR's:  normal    Cervical Dermatomes:  normal    Cervical Palpation:    Tenderness present at Left:    Scalenes; Rhomboids; Upper Trap; Levator; Erector Spinae and Suboccipitals    Cord Sign:  normal    Assessment/Plan:    Patient is a 53 year old female with cervical complaints.    Patient has the following significant findings with corresponding treatment plan.                Diagnosis 1:  Chronic L cx pain   Pain -  hot/cold therapy, US, manual therapy, self management, education, directional preference exercise and home program  Decreased ROM/flexibility - manual therapy and therapeutic exercise    Therapy Evaluation Codes:   1) History comprised of:   Personal factors that impact the plan of care:      Age, Past/current experiences and Time since onset of symptoms.    Comorbidity factors that impact the plan of care are:      Numbness/tingling.     Medications impacting care: Anti-inflammatory, Sleep and hormone replacement .  2) Examination of Body Systems comprised of:   Body structures and functions that impact the plan of care:      Cervical  spine.   Activity limitations that impact the plan of care are:      Lifting, Sitting, Standing, Working, Sleeping and Laying down.  3) Clinical presentation characteristics are:   Stable/Uncomplicated.  4) Decision-Making    Low complexity using standardized patient assessment instrument and/or measureable assessment of functional outcome.    Cumulative Therapy Evaluation is: Low complexity.  Previous and current functional limitations:  (See Goal Flow Sheet for this information)    Short term and Long term goals: (See Goal Flow Sheet for this information)   Communication ability:  Patient appears to be able to clearly communicate and understand verbal and written communication and follow directions correctly.  Treatment Explanation - The following has been discussed with the patient:   RX ordered/plan of care  Anticipated outcomes  Possible risks and side effects  This patient would benefit from PT intervention to resume normal activities.   Rehab potential is good.    Frequency:  1 X week, once daily  Duration:  for 6 weeks  Discharge Plan:  Achieve all LTG.  Independent in home treatment program.  Reach maximal therapeutic benefit.    Thank you for your referral.    INQUIRIES  Therapist: David Meyer, PT, ATC, Cert. [facility]  [address] [place] [address]  Phone: [phone]  Fax: [phone]

## 2018-11-01 PROBLEM — M54.2 NECK PAIN: Status: ACTIVE | Noted: 2018-11-01

## 2018-11-05 ENCOUNTER — THERAPY VISIT (OUTPATIENT)
Dept: PHYSICAL THERAPY | Facility: CLINIC | Age: 53
End: 2018-11-05
Payer: COMMERCIAL

## 2018-11-05 DIAGNOSIS — M25.562 ACUTE PAIN OF LEFT KNEE: Primary | ICD-10-CM

## 2018-11-05 PROCEDURE — 97161 PT EVAL LOW COMPLEX 20 MIN: CPT | Mod: GP | Performed by: PHYSICAL THERAPIST

## 2018-11-05 PROCEDURE — 97110 THERAPEUTIC EXERCISES: CPT | Mod: GP | Performed by: PHYSICAL THERAPIST

## 2018-11-05 NOTE — PROGRESS NOTES
[facility] Initial Evaluation  Subjective:  HPI Comments: ADLS 85/100     Patient is a 53 year old female presenting with rehab left knee hpi.   Dipti Chandler is a 53 year old female with a left knee condition.  Condition occurred with:  Insidious onset and repetition/overuse.  Condition occurred: during recreation/sport.  This is a recurrent condition  10/17/18.    Patient reports pain:  Lateral and anterior.  Radiates to:  Knee.  Pain is described as sharp and is intermittent and reported as 5/10.  Associated symptoms:  Loss of strength. Pain is worse during the day.  Symptoms are exacerbated by bending/squatting, kneeling, sitting, walking, weight bearing, standing, running, descending stairs and ascending stairs and relieved by ice and rest.  Since onset symptoms are unchanged.        General health as reported by patient is good.  Pertinent medical history includes:  Migraines/headaches.  Medical allergies: no.  Other surgeries include:  Orthopedic surgery (L ankle and L shoulder ).  Current medications:  Anti-inflammatory, hormone replacement therapy and sleep medication.  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  Primary job tasks include:  Prolonged standing and repetitive tasks.    Barriers include:  None as reported by the patient.    Red flags:  None as reported by the patient.                        Objective:  Standing Alignment:    Cervical/Thoracic:  Forward head  Shoulder/UE:  Scapular upward rotation R        Knee:  Genu valgus L and genu valgus R      Gait:    Gait Type:  Normal   Weight Bearing Status:  WBAT   Assistive Devices:  None        Neurological: She is alert. She has normal reflexes. No cranial nerve deficit or sensory deficit.                                                   Knee Evaluation:  ROM:  Strength wnl knee: quad, hip, glute and core strength weakness  4-/5 strength     PROM    Hyperextension: Left: 0    Right:   0  Extension: Left: 0    Right:  0  Flexion: Left: 135    Right:  135          Special Tests:   Left knee positive for the following special tests:  Patellar Compression    Palpation:    Left knee tenderness present at:  IT Band; Biceps Femoral and Patellar Lateral              General     ROS    Assessment/Plan:    Patient is a 53 year old female with left side knee complaints.    Patient has the following significant findings with corresponding treatment plan.                Diagnosis 1:  L knee pain       Therapy Evaluation Codes:   1) History comprised of:   Personal factors that impact the plan of care:      Past/current experiences and Time since onset of symptoms.    Comorbidity factors that impact the plan of care are:      None.     Medications impacting care: Anti-inflammatory.  2) Examination of Body Systems comprised of:   Body structures and functions that impact the plan of care:      Knee.   Activity limitations that impact the plan of care are:      Bathing, Bending, Driving, Dressing, Lifting, Running, Sitting, Sports, Squatting/kneeling, Stairs, Standing, Walking and Sleeping.  3) Clinical presentation characteristics are:   Stable/Uncomplicated.  4) Decision-Making    Low complexity using standardized patient assessment instrument and/or measureable assessment of functional outcome.  Cumulative Therapy Evaluation is: Low complexity.    Previous and current functional limitations:  (See Goal Flow Sheet for this information)    Short term and Long term goals: (See Goal Flow Sheet for this information)     Communication ability:  Patient appears to be able to clearly communicate and understand verbal and written communication and follow directions correctly.  Treatment Explanation - The following has been discussed with the patient:   RX ordered/plan of care  Anticipated outcomes  Possible risks and side effects  This patient would benefit from PT intervention to resume normal activities.   Rehab  potential is good.    Frequency:  1 X week, once daily  Duration:  for 6 weeks  Discharge Plan:  Achieve all LTG.  Independent in home treatment program.  Reach maximal therapeutic benefit.    Please refer to the daily flowsheet for treatment today, total treatment time and time spent performing 1:1 timed codes.

## 2018-11-05 NOTE — LETTER
[facility]  [address]  [place] [address]  [phone]    2018    Re: Dipti Chandler   :   1965  MRN:  [id number]   REFERRING PHYSICIAN:   [facility]    Date of Initial Evaluation:  18  Visits:  Rxs Used: 2  Reason for Referral:  Acute pain of left knee    [facility] Initial Evaluation    Subjective:  HPI Comments: ADLS 85/100   Patient is a 53 year old female presenting with rehab left knee hpi.   Dipti Chandler is a 53 year old female with a left knee condition.  Condition occurred with:  Insidious onset and repetition/overuse.  Condition occurred: during recreation/sport.  This is a recurrent condition  10/17/18.    Patient reports pain:  Lateral and anterior.  Radiates to:  Knee.  Pain is described as sharp and is intermittent and reported as 5/10.  Associated symptoms:  Loss of strength. Pain is worse during the day.  Symptoms are exacerbated by bending/squatting, kneeling, sitting, walking, weight bearing, standing, running, descending stairs and ascending stairs and relieved by ice and rest.  Since onset symptoms are unchanged.        General health as reported by patient is good.  Pertinent medical history includes:  Migraines/headaches.  Medical allergies: no.  Other surgeries include:  Orthopedic surgery (L ankle and L shoulder ).  Current medications:  Anti-inflammatory, hormone replacement therapy and sleep medication.  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  Primary job tasks include:  Prolonged standing and repetitive tasks.  Barriers include:  None as reported by the patient.  Red flags:  None as reported by the patient.    Objective:  Standing Alignment:    Cervical/Thoracic:  Forward head  Shoulder/UE:  Scapular upward rotation R  Knee:  Genu valgus L and genu valgus R    Gait:    Gait Type:  Normal   Weight Bearing Status:  WBAT   Assistive  Devices:  None  Neurological: She is alert. She has normal reflexes. No cranial nerve deficit or sensory deficit.     Knee Evaluation:  ROM:  Strength wnl knee: quad, hip, glute and core strength weakness  4-/5 strength     PROM  Hyperextension: Left: 0    Right:  0  Extension: Left: 0    Right:  0  Flexion: Left: 135    Right:  135    Special Tests:   Left knee positive for the following special tests:  Patellar Compression    Palpation:    Left knee tenderness present at:  IT Band; Biceps Femoral and Patellar Lateral    Assessment/Plan:    Patient is a 53 year old female with left side knee complaints.    Patient has the following significant findings with corresponding treatment plan.                Diagnosis 1:  L knee pain       Therapy Evaluation Codes:   1) History comprised of:   Personal factors that impact the plan of care:      Past/current experiences and Time since onset of symptoms.    Comorbidity factors that impact the plan of care are:      None.     Medications impacting care: Anti-inflammatory.  2) Examination of Body Systems comprised of:   Body structures and functions that impact the plan of care:      Knee.   Activity limitations that impact the plan of care are:      Bathing, Bending, Driving, Dressing, Lifting, Running, Sitting, Sports, Squatting/kneeling, Stairs, Standing, Walking and Sleeping.  3) Clinical presentation characteristics are:   Stable/Uncomplicated.  4) Decision-Making    Low complexity using standardized patient assessment instrument and/or measureable assessment of functional outcome.    Cumulative Therapy Evaluation is: Low complexity.  Previous and current functional limitations:  (See Goal Flow Sheet for this information)    Short term and Long term goals: (See Goal Flow Sheet for this information)   Communication ability:  Patient appears to be able to clearly communicate and understand verbal and written communication and follow directions correctly.  Treatment  Explanation - The following has been discussed with the patient:   RX ordered/plan of care  Anticipated outcomes  Possible risks and side effects  This patient would benefit from PT intervention to resume normal activities.   Rehab potential is good.    Frequency:  1 X week, once daily  Duration:  for 6 weeks  Discharge Plan:  Achieve all LTG.  Independent in home treatment program.  Reach maximal therapeutic benefit.    Thank you for your referral.    INQUIRIES  Therapist: David Meyer, PT, ATC, Cert. [facility]  [address]  [place] [address]  Phone: [phone]  Fax: [phone]

## 2018-11-06 NOTE — PROGRESS NOTES
[facility] Initial Evaluation  Subjective:  HPI Comments: CDI 20/100     Patient is a 53 year old female presenting with rehab cervical spine hpi. The history is provided by the patient. No  was used.   Dipti Chandler is a 53 year old female with a cervical spine condition.  Condition occurred with:  Degenerative joint disease and repetition/overuse.  Condition occurred: for unknown reasons.  This is a recurrent condition  10/22/18.    Patient reports pain:  Cervical left side.    Pain is described as aching and sharp and is intermittent and reported as 3/10.  Associated symptoms:  Loss of motion/stiffness. Pain is the same all the time.  Symptoms are exacerbated by carrying, lifting, rotating head, looking up or down, stress and sitting and relieved by heat, muscle relaxants and other (injection therapy ).  Since onset symptoms are unchanged.  Special tests:  X-ray.  Previous treatment includes physical therapy.  There was moderate improvement following previous treatment.  General health as reported by patient is excellent.  Pertinent medical history includes:  Numbness/tingling and migraines/headaches.  Medical allergies: no.  Other surgeries include:  Orthopedic surgery (L ankle and L shoulder surgeries).  Current medications:  Anti-inflammatory and sleep medication (hormone replacement ).  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  Primary job tasks include:  Repetitive tasks, prolonged sitting and prolonged standing (computer ).    Barriers include:  None as reported by the patient.    Red flags:  None as reported by the patient.           Knee Activity of Daily Living Score: 85.71            Objective:  Standing Alignment:    Cervical/Thoracic:  Forward head and thoracic kyphosis increased  Shoulder/UE:  Rounded shoulders              Gait:  L knee pain   Gait Type:  Antalgic   Weight Bearing Status:  WBAT   Assistive  Devices:  None      Flexibility/Screens:   Positive screens:  Cervical and Knee          Neurological: She is alert. She has normal reflexes. No cranial nerve deficit or sensory deficit.                 Cervical/Thoracic Evaluation    AROM:  AROM Cervical:    Flexion:          ERP , limited ROM, central cx spine and UT  Extension:       Moderate limitation, ERP, central cx spine   Rotation:         Left: ERP , L UT, referred sx's to L hand      Right: ERP, L UT and referred sx's to L hand   Side Bend:      Left:     Right:       Headaches: cervical  Cervical Myotomes:  normal                  DTR's:  normal          Cervical Dermatomes:  normal                    Cervical Palpation:    Tenderness present at Left:    Scalenes; Rhomboids; Upper Trap; Levator; Erector Spinae and Suboccipitals          Cord Sign:  normal                                            General     ROS    Assessment/Plan:    Patient is a 53 year old female with cervical complaints.    Patient has the following significant findings with corresponding treatment plan.                Diagnosis 1:  Chronic L cx pain   Pain -  hot/cold therapy, US, manual therapy, self management, education, directional preference exercise and home program  Decreased ROM/flexibility - manual therapy and therapeutic exercise    Therapy Evaluation Codes:   1) History comprised of:   Personal factors that impact the plan of care:      Age, Past/current experiences and Time since onset of symptoms.    Comorbidity factors that impact the plan of care are:      Numbness/tingling.     Medications impacting care: Anti-inflammatory, Sleep and hormone replacement .  2) Examination of Body Systems comprised of:   Body structures and functions that impact the plan of care:      Cervical spine.   Activity limitations that impact the plan of care are:      Lifting, Sitting, Standing, Working, Sleeping and Laying down.  3) Clinical presentation characteristics  are:   Stable/Uncomplicated.  4) Decision-Making    Low complexity using standardized patient assessment instrument and/or measureable assessment of functional outcome.  Cumulative Therapy Evaluation is: Low complexity.    Previous and current functional limitations:  (See Goal Flow Sheet for this information)    Short term and Long term goals: (See Goal Flow Sheet for this information)     Communication ability:  Patient appears to be able to clearly communicate and understand verbal and written communication and follow directions correctly.  Treatment Explanation - The following has been discussed with the patient:   RX ordered/plan of care  Anticipated outcomes  Possible risks and side effects  This patient would benefit from PT intervention to resume normal activities.   Rehab potential is good.    Frequency:  1 X week, once daily  Duration:  for 6 weeks  Discharge Plan:  Achieve all LTG.  Independent in home treatment program.  Reach maximal therapeutic benefit.    Please refer to the daily flowsheet for treatment today, total treatment time and time spent performing 1:1 timed codes.

## 2018-11-07 ENCOUNTER — THERAPY VISIT (OUTPATIENT)
Dept: PHYSICAL THERAPY | Facility: CLINIC | Age: 53
End: 2018-11-07
Payer: COMMERCIAL

## 2018-11-07 DIAGNOSIS — M54.2 NECK PAIN: ICD-10-CM

## 2018-11-07 DIAGNOSIS — M25.562 ACUTE PAIN OF LEFT KNEE: ICD-10-CM

## 2018-11-07 PROCEDURE — 97110 THERAPEUTIC EXERCISES: CPT | Mod: GP | Performed by: PHYSICAL THERAPIST

## 2018-11-07 PROCEDURE — 97140 MANUAL THERAPY 1/> REGIONS: CPT | Mod: GP | Performed by: PHYSICAL THERAPIST

## 2018-11-07 PROCEDURE — 97035 APP MDLTY 1+ULTRASOUND EA 15: CPT | Mod: GP | Performed by: PHYSICAL THERAPIST

## 2018-11-21 ENCOUNTER — THERAPY VISIT (OUTPATIENT)
Dept: PHYSICAL THERAPY | Facility: CLINIC | Age: 53
End: 2018-11-21
Payer: COMMERCIAL

## 2018-11-21 DIAGNOSIS — M54.2 NECK PAIN: ICD-10-CM

## 2018-11-21 DIAGNOSIS — M25.562 ACUTE PAIN OF LEFT KNEE: ICD-10-CM

## 2018-11-21 PROCEDURE — 97110 THERAPEUTIC EXERCISES: CPT | Mod: GP | Performed by: PHYSICAL THERAPIST

## 2018-11-21 PROCEDURE — 97112 NEUROMUSCULAR REEDUCATION: CPT | Mod: GP | Performed by: PHYSICAL THERAPIST

## 2018-11-21 PROCEDURE — 97140 MANUAL THERAPY 1/> REGIONS: CPT | Mod: GP | Performed by: PHYSICAL THERAPIST

## 2019-01-03 ENCOUNTER — PATIENT OUTREACH (OUTPATIENT)
Dept: CARE COORDINATION | Facility: CLINIC | Age: 54
End: 2019-01-03

## 2019-01-14 ENCOUNTER — OFFICE VISIT (OUTPATIENT)
Dept: DERMATOLOGY | Facility: CLINIC | Age: 54
End: 2019-01-14
Payer: COMMERCIAL

## 2019-01-14 ENCOUNTER — MEDICAL CORRESPONDENCE (OUTPATIENT)
Dept: HEALTH INFORMATION MANAGEMENT | Facility: CLINIC | Age: 54
End: 2019-01-14

## 2019-01-14 DIAGNOSIS — D22.9 ATYPICAL NEVI: Primary | ICD-10-CM

## 2019-01-14 DIAGNOSIS — D18.01 CHERRY ANGIOMA: ICD-10-CM

## 2019-01-14 DIAGNOSIS — Z12.83 SKIN EXAM, SCREENING FOR CANCER: ICD-10-CM

## 2019-01-14 RX ORDER — FAMOTIDINE 20 MG
TABLET ORAL
COMMUNITY

## 2019-01-14 ASSESSMENT — PAIN SCALES - GENERAL: PAINLEVEL: NO PAIN (0)

## 2019-01-14 NOTE — PROGRESS NOTES
"[facility] Dermatology Note      Dermatology Problem List:  Patient is a neurologist  1.Dysplastic nevi. Multiple; mild-moderate.   2. AGA; laser comb/minoxidil 5%  3. Dilated, inflamed pore, right nasal ala, monitor + differin OTC    Encounter Date: Jan 14, 2019    CC:   Chief Complaint   Patient presents with     Skin Check     Skin check, one lesion of concern noted on tip of nose.       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 The patient was last seen 2/13/18 when we did a full body skin exam notable for benign appearing nevi with NERD at previous biopsy sites. Per the patient, she was seen at an outside dermatology clinic in December for a \"dilated blood vessel spot\" on the nasal tip (right nasal ala on exam) which was not healing over at least one month. She was told they would need to \"zap it\" but they deferred this because she was going to leave for [facility]. She is otherwise well today in her baseline state of health with no additional concerns.  She protects her skin from the sun but had significant sun exposure when she grew up in [facility]. She has never had a skin cancer.     Past Medical History:   Patient Active Problem List   Diagnosis     Atypical nevi     CARDIOVASCULAR SCREENING; LDL GOAL LESS THAN 160     Skin exam, screening for cancer     Other postprocedural status(V45.89)     Neck pain     Acute pain of left knee     No past medical history on file.  Past Surgical History:   Procedure Laterality Date     BIOPSY OF SKIN LESION       Social History:  Patient reports that  has never smoked. she has never used smokeless tobacco.    Family History:  Family History   Problem Relation Age of Onset     Skin Cancer No family hx of      Melanoma No family hx of      Medications:  Current Outpatient Medications   Medication Sig Dispense Refill     Acetaminophen (TYLENOL 8 HOUR PO) Take  by mouth.       " cycloSPORINE (RESTASIS) 0.05 % ophthalmic emulsion Apply 1 drop to eye every 12 hours.       esomeprazole (NEXIUM) 20 MG capsule Take 20 mg by mouth every morning (before breakfast). One hour before meals       fexofenadine (ALLEGRA) 180 MG tablet Take 180 mg by mouth daily.       fish oil-omega-3 fatty acids (FISH OIL) 1000 MG capsule Take 21 g by mouth daily       frovatriptan (FROVA) 2.5 MG tablet Take 2.5 mg by mouth at onset of headache.       ibuprofen (ADVIL) 200 MG tablet Take 200 mg by mouth every 4 hours as needed.       methylphenidate (RITALIN LA) 10 MG CP24 Take 10 mg by mouth daily       MetroNIDazole (METROGEL) 1 % gel Apply 0.5 inches topically daily. Apply and rub in a thin film to affected areas twice daily       PILOCARPINE HCL PO Take 4 mg by mouth 3 times daily       ranitidine (ZANTAC 75) 75 MG tablet Take 75 mg by mouth 2 times daily.       Suvorexant (BELSOMRA) 5 MG tablet Take 10 mg by mouth nightly as needed       Vitamin D, Cholecalciferol, 1000 units CAPS        zolpidem (AMBIEN) 5 MG tablet Take 5 mg by mouth nightly as needed.          No Known Allergies      Review of Systems:  -As per HPI  -Constitutional: Otherwise feeling well today, in usual state of health.  -Skin: As above in HPI. No additional skin concerns.    Physical exam:  Vitals: There were no vitals taken for this visit.  GEN: This is a well developed, well-nourished female in no acute distress, in a pleasant mood.    SKIN: Full skin, which includes the head/face, both arms, chest, back, abdomen,both legs, genitalia and/or groin buttocks, digits and/or nails, was examined.  -There are dome shaped bright red papules on the trunk and extremities.   -Multiple regular brown pigmented macules and papules are identified on the trunk and extremities (regular reticulated pigment network without concerning feature on dermoscopy).   - right nasal ala has a dilated pore with minimal surrounding erythema, no concerning horizontal  blood vessels or concerning features   - nails are painted  -No other lesions of concern on areas examined.     Impression/Plan:  1. Multiple clinically benign nevi on the trunk and extremities with history of dysplastic nevi    Sun precaution was advised including the use of sun screens of SPF 30 or higher, sun protective clothing, and avoidance of tanning beds.    ABCDEs of melanoma discussed and handout provided     Consider follow up or visit with Dr. Cleary for mole mapping    2. Dilated pore with surrounding erythema of right nasal ala    We will clinically monitor this area for resolution    No concerning features on dermoscpy    The nature of sun-induced photo-aging and skin cancers is discussed.  Sun avoidance, protective clothing, and the use of 30-SPF sunscreens is advised. Observe closely for skin damage/changes, and call if such occurs.    Recommended over the counter differin     3. Cherry angioma(s)    No further intervention required. Patient to report changes.     CC Bear Davison MD  [facility]  [address] [place] [place] [address] on close of this encounter.  Follow-up in 1 year, earlier for new or changing lesions.      staffed the patient.    Staff Involved:  Resident(Zora Bermeo)/Staff    .I, Jennifer Dickson MD, saw this patient with the resident and agree with the resident s findings and plan of care as documented in the resident s note.

## 2019-01-14 NOTE — NURSING NOTE
Dermatology Rooming Note    Dipti Chandler's goals for this visit include:   Chief Complaint   Patient presents with     Skin Check     Skin check, one lesion of concern noted on tip of nose.       Renu Villa LPN

## 2019-01-14 NOTE — PATIENT INSTRUCTIONS
The ABCDEs of Melanoma    Skin cancer can develop anywhere on the skin. Ask someone for help when checking your skin, especially in hard to see places. If you notice a mole different from others, or that changes, enlarges, itches, or bleeds (even if it is small), you should see a dermatologist.                Great job! Your skin looks excellent today!    We are not concerned about any moles on your skin today. For that spot on your nose, please try differin over the counter (topical retinoid) and this may help resolve the site. We will see you back in one year, sooner if needed.            The ABCDEs of Melanoma    Skin cancer can develop anywhere on the skin. Ask someone for help when checking your skin, especially in hard to see places. If you notice a mole different from others, or that changes, enlarges, itches, or bleeds (even if it is small), you should see a dermatologist.

## 2019-01-14 NOTE — LETTER
"1/14/2019       RE: Dipti Chandler  [address] [facility] [id number]     Dear Colleague,    Thank you for referring your patient, Dipti Chandler, to the [facility] DERMATOLOGY at [facility]. Please see a copy of my visit note below.    [facility] Dermatology Note      Dermatology Problem List:  Patient is a neurologist  1.Dysplastic nevi. Multiple; mild-moderate.   2. AGA; laser comb/minoxidil 5%  3. Dilated, inflamed pore, right nasal ala, monitor + differin OTC    Encounter Date: Jan 14, 2019    CC:   Chief Complaint   Patient presents with     Skin Check     Skin check, one lesion of concern noted on tip of nose.       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 The patient was last seen 2/13/18 when we did a full body skin exam notable for benign appearing nevi with NERD at previous biopsy sites. Per the patient, she was seen at an outside dermatology clinic in December for a \"dilated blood vessel spot\" on the nasal tip (right nasal ala on exam) which was not healing over at least one month. She was told they would need to \"zap it\" but they deferred this because she was going to leave for [facility]. She is otherwise well today in her baseline state of health with no additional concerns.  She protects her skin from the sun but had significant sun exposure when she grew up in [facility]. She has never had a skin cancer.     Past Medical History:   Patient Active Problem List   Diagnosis     Atypical nevi     CARDIOVASCULAR SCREENING; LDL GOAL LESS THAN 160     Skin exam, screening for cancer     Other postprocedural status(V45.89)     Neck pain     Acute pain of left knee     No past medical history on file.  Past Surgical History:   Procedure Laterality Date     BIOPSY OF SKIN LESION       Social History:  Patient reports that  has never smoked. she has never used smokeless " tobacco.    Family History:  Family History   Problem Relation Age of Onset     Skin Cancer No family hx of      Melanoma No family hx of      Medications:  Current Outpatient Medications   Medication Sig Dispense Refill     Acetaminophen (TYLENOL 8 HOUR PO) Take  by mouth.       cycloSPORINE (RESTASIS) 0.05 % ophthalmic emulsion Apply 1 drop to eye every 12 hours.       esomeprazole (NEXIUM) 20 MG capsule Take 20 mg by mouth every morning (before breakfast). One hour before meals       fexofenadine (ALLEGRA) 180 MG tablet Take 180 mg by mouth daily.       fish oil-omega-3 fatty acids (FISH OIL) 1000 MG capsule Take 21 g by mouth daily       frovatriptan (FROVA) 2.5 MG tablet Take 2.5 mg by mouth at onset of headache.       ibuprofen (ADVIL) 200 MG tablet Take 200 mg by mouth every 4 hours as needed.       methylphenidate (RITALIN LA) 10 MG CP24 Take 10 mg by mouth daily       MetroNIDazole (METROGEL) 1 % gel Apply 0.5 inches topically daily. Apply and rub in a thin film to affected areas twice daily       PILOCARPINE HCL PO Take 4 mg by mouth 3 times daily       ranitidine (ZANTAC 75) 75 MG tablet Take 75 mg by mouth 2 times daily.       Suvorexant (BELSOMRA) 5 MG tablet Take 10 mg by mouth nightly as needed       Vitamin D, Cholecalciferol, 1000 units CAPS        zolpidem (AMBIEN) 5 MG tablet Take 5 mg by mouth nightly as needed.          No Known Allergies      Review of Systems:  -As per HPI  -Constitutional: Otherwise feeling well today, in usual state of health.  -Skin: As above in HPI. No additional skin concerns.    Physical exam:  Vitals: There were no vitals taken for this visit.  GEN: This is a well developed, well-nourished female in no acute distress, in a pleasant mood.    SKIN: Full skin, which includes the head/face, both arms, chest, back, abdomen,both legs, genitalia and/or groin buttocks, digits and/or nails, was examined.  -There are dome shaped bright red papules on the trunk and extremities.    -Multiple regular brown pigmented macules and papules are identified on the trunk and extremities (regular reticulated pigment network without concerning feature on dermoscopy).   - right nasal ala has a dilated pore with minimal surrounding erythema, no concerning horizontal blood vessels or concerning features   - nails are painted  -No other lesions of concern on areas examined.     Impression/Plan:  1. Multiple clinically benign nevi on the trunk and extremities with history of dysplastic nevi    Sun precaution was advised including the use of sun screens of SPF 30 or higher, sun protective clothing, and avoidance of tanning beds.    ABCDEs of melanoma discussed and handout provided     Consider follow up or visit with Dr. Cleary for mole mapping    2. Dilated pore with surrounding erythema of right nasal ala    We will clinically monitor this area for resolution    No concerning features on dermoscpy    The nature of sun-induced photo-aging and skin cancers is discussed.  Sun avoidance, protective clothing, and the use of 30-SPF sunscreens is advised. Observe closely for skin damage/changes, and call if such occurs.    Recommended over the counter differin     3. Cherry angioma(s)    No further intervention required. Patient to report changes.     CC Bear Davison MD  [facility]  [address] [place] [address] on close of this encounter.  Follow-up in 1 year, earlier for new or changing lesions.      staffed the patient.    Staff Involved:  Resident(Zora Bermeo)/Staff    .I, Jennifer Dickson MD, saw this patient with the resident and agree with the resident s findings and plan of care as documented in the resident s note.    Again, thank you for allowing me to participate in the care of your patient.      Sincerely,    Jennifer Dickson MD

## 2019-03-04 NOTE — PROGRESS NOTES
Subjective:  HPI                    Objective:  System    Physical Exam    General     ROS    Assessment/Plan:    DISCHARGE REPORT        SUBJECTIVE   Dipti is improved sleeping at night as well as reciprocating up stairs. Difficult descending. She c/o bilaterall neck pain yet, intermittantly, especially L UT   .    Current Pain level: 4/10   Initial Pain level: 5/10   Changes in function: Yes, see goal flow sheet for change in function   Adverse reactions: None;   ,         OBJECTIVE   ADvanced neck and knee exercises along , docuimented in PTrx  for self care. In clinic, we are adressing knee through strength intervention as well as her neck with strength, posture mangement , pec and upper back stretch. Recommend 3-4 visits over the next month. Next step is CKC exercises for her hips/glutes and quads to manage her knees. Upper body strength, specifcally postural MM. Consider manual thoracic mobilization next visit       ASSESSMENT/PLAN  Updated problem list and treatment plan: Diagnosis 1:  Knee pain     Diagnosis 2:  Neck pain       Assessment of Progress: The patient's condition is improving.  The patient's condition has potential to improve.      Recommendations:  This patient is ready to be discharged from therapy and continue their home treatment program.    Please refer to the daily flowsheet for treatment today, total treatment time and time spent performing 1:1 timed codes.

## 2019-11-05 ENCOUNTER — HEALTH MAINTENANCE LETTER (OUTPATIENT)
Age: 54
End: 2019-11-05

## 2020-01-28 ENCOUNTER — DOCUMENTATION ONLY (OUTPATIENT)
Dept: CARE COORDINATION | Facility: CLINIC | Age: 55
End: 2020-01-28

## 2020-03-13 ENCOUNTER — OFFICE VISIT (OUTPATIENT)
Dept: DERMATOLOGY | Facility: CLINIC | Age: 55
End: 2020-03-13
Payer: COMMERCIAL

## 2020-03-13 VITALS — HEART RATE: 76 BPM | DIASTOLIC BLOOD PRESSURE: 83 MMHG | SYSTOLIC BLOOD PRESSURE: 122 MMHG

## 2020-03-13 DIAGNOSIS — Z87.898 HISTORY OF ATYPICAL NEVUS: ICD-10-CM

## 2020-03-13 DIAGNOSIS — D18.01 CHERRY ANGIOMA: ICD-10-CM

## 2020-03-13 DIAGNOSIS — D22.9 MULTIPLE MELANOCYTIC NEVI: Primary | ICD-10-CM

## 2020-03-13 ASSESSMENT — PAIN SCALES - GENERAL: PAINLEVEL: NO PAIN (0)

## 2020-03-13 NOTE — LETTER
3/13/2020       RE: Dipti Chandler  [address] [facility] [id number]     Dear Colleague,    Thank you for referring your patient, Dipti Chandler, to the [facility] at [facility]. Please see a copy of my visit note below.    [facility] Note      Dermatology Problem List:  Patient is a neurologist  1. Dysplastic nevi. Multiple; mild-moderate.   2. AGA; laser comb/minoxidil 5%  3. Dilated, inflamed pore, right nasal ala, monitor + differin OTC    Encounter Date: Mar 13, 2020    CC:  Chief Complaint   Patient presents with     Skin Check     Dipti is here today for a skin check. She does not have any areas of concern         History of Present Illness:  Ms. Dipti Chandler is a 54 year old female who presents for a skin check. The patient reports that she has a fair number of moles as well as a fair history of sun exposure as she used to live in [facility]. She states that she is diligent about sun protection. She is most concerned about her back as this is an area that she is not able to regularly monitor on her own. She also has multiple red lesions which she states are itchy. The patient voices no other concerns.    Past Medical History:   Patient Active Problem List   Diagnosis     Atypical nevi     CARDIOVASCULAR SCREENING; LDL GOAL LESS THAN 160     Skin exam, screening for cancer     Other postprocedural status(V45.89)     Neck pain     Acute pain of left knee     History reviewed. No pertinent past medical history.  Past Surgical History:   Procedure Laterality Date     BIOPSY OF SKIN LESION         Social History:  Patient reports that she has never smoked. She has never used smokeless tobacco.    Family History:  Family History   Problem Relation Age of Onset     Skin Cancer No family hx of      Melanoma No family hx of        Medications:  Current Outpatient Medications   Medication Sig Dispense Refill     Acetaminophen  (TYLENOL 8 HOUR PO) Take  by mouth.       cycloSPORINE (RESTASIS) 0.05 % ophthalmic emulsion Apply 1 drop to eye every 12 hours.       esomeprazole (NEXIUM) 20 MG capsule Take 20 mg by mouth every morning (before breakfast). One hour before meals       fish oil-omega-3 fatty acids (FISH OIL) 1000 MG capsule Take 21 g by mouth daily       frovatriptan (FROVA) 2.5 MG tablet Take 2.5 mg by mouth at onset of headache.       methylphenidate (RITALIN LA) 10 MG CP24 Take 10 mg by mouth daily       MetroNIDazole (METROGEL) 1 % gel Apply 0.5 inches topically daily. Apply and rub in a thin film to affected areas twice daily       PILOCARPINE HCL PO Take 4 mg by mouth 3 times daily       ranitidine (ZANTAC 75) 75 MG tablet Take 75 mg by mouth 2 times daily.       Suvorexant (BELSOMRA) 5 MG tablet Take 10 mg by mouth nightly as needed       Vitamin D, Cholecalciferol, 1000 units CAPS        fexofenadine (ALLEGRA) 180 MG tablet Take 180 mg by mouth daily.       ibuprofen (ADVIL) 200 MG tablet Take 200 mg by mouth every 4 hours as needed.       zolpidem (AMBIEN) 5 MG tablet Take 5 mg by mouth nightly as needed.         Allergies   Allergen Reactions     Doxycycline Diarrhea     Erythromycin Diarrhea     Hydrocodone Nausea and Vomiting     Hydrocodone-Acetaminophen      Other reaction(s): Vomiting     Lactose Diarrhea     Oxycodone Hcl Nausea and Vomiting     Oxycodone-Acetaminophen      Other reaction(s): Vomiting     Penicillin G Procaine Diarrhea     Penicillins Diarrhea     Tramadol Nausea and Vomiting     Other reaction(s): Vomiting       Review of Systems:  -Constitutional: Otherwise feeling well today, in usual state of health.  -HEENT: Patient denies nonhealing oral sores.  -Skin: As above in HPI. No additional skin concerns.    Physical exam:  Vitals: /83 (BP Location: Right arm, Patient Position: Sitting, Cuff Size: Adult Regular)   Pulse 76   GEN: This is a well developed, well-nourished female in no acute  distress, in a pleasant mood.    SKIN: Full skin, which includes the head/face, both arms, chest, back, abdomen,both legs, genitalia and/or groin buttocks, digits and/or nails, was examined.  -Anthony skin type: III/IV  -There are dome shaped bright red papules on the trunk and extremities.   Multiple regular brown pigmented macules and papules are identified on the trunk and extremities.   -No other lesions of concern on areas examined.       Impression/Plan:  1. Multiple banal-appearing melanocytic nevi  - Discussed the natural etiology and provided reassurances about the benign nature of the lesions.   -Good candidate for mole mapping. Pt will follow up with Dr Chavez    2. Cherry angiomata  - Discussed the natural etiology and provided reassurances about the benign nature of the lesions. We discussed the risks, benefits, and efficacy of laser treatment  -the patent will follow-up with Dr Alcala or Dr Chavez in cosmetics for further evaluation and management.        Follow-up in 1 year, earlier for new or changing lesions.       Staff Involved:  Scribe/Staff    Scribe Disclosure  I, Dominic Najjar, am serving as a scribe to document services personally performed by Dr. Elbert Johnson MD, based on data collection and the provider's statements to me.    Provider Disclosure:   The documentation recorded by the scribe accurately reflects the services I personally performed and the decisions made by me.    Elbert Johnson MD   of Dermatology  Department of Dermatology  [facility]

## 2020-03-13 NOTE — PROGRESS NOTES
[facility] Dermatology Note      Dermatology Problem List:  Patient is a neurologist  1. Dysplastic nevi. Multiple; mild-moderate.   2. AGA; laser comb/minoxidil 5%  3. Dilated, inflamed pore, right nasal ala, monitor + differin OTC    Encounter Date: Mar 13, 2020    CC:  Chief Complaint   Patient presents with     Skin Check     Dipti is here today for a skin check. She does not have any areas of concern         History of Present Illness:  Ms. Dipti Chandler is a 54 year old female who presents for a skin check. The patient reports that she has a fair number of moles as well as a fair history of sun exposure as she used to live in [facility]. She states that she is diligent about sun protection. She is most concerned about her back as this is an area that she is not able to regularly monitor on her own. She also has multiple red lesions which she states are itchy. The patient voices no other concerns.    Past Medical History:   Patient Active Problem List   Diagnosis     Atypical nevi     CARDIOVASCULAR SCREENING; LDL GOAL LESS THAN 160     Skin exam, screening for cancer     Other postprocedural status(V45.89)     Neck pain     Acute pain of left knee     History reviewed. No pertinent past medical history.  Past Surgical History:   Procedure Laterality Date     BIOPSY OF SKIN LESION         Social History:  Patient reports that she has never smoked. She has never used smokeless tobacco.    Family History:  Family History   Problem Relation Age of Onset     Skin Cancer No family hx of      Melanoma No family hx of        Medications:  Current Outpatient Medications   Medication Sig Dispense Refill     Acetaminophen (TYLENOL 8 HOUR PO) Take  by mouth.       cycloSPORINE (RESTASIS) 0.05 % ophthalmic emulsion Apply 1 drop to eye every 12 hours.       esomeprazole (NEXIUM) 20 MG capsule Take 20 mg by mouth every morning (before breakfast). One hour before meals       fish oil-omega-3 fatty acids  (FISH OIL) 1000 MG capsule Take 21 g by mouth daily       frovatriptan (FROVA) 2.5 MG tablet Take 2.5 mg by mouth at onset of headache.       methylphenidate (RITALIN LA) 10 MG CP24 Take 10 mg by mouth daily       MetroNIDazole (METROGEL) 1 % gel Apply 0.5 inches topically daily. Apply and rub in a thin film to affected areas twice daily       PILOCARPINE HCL PO Take 4 mg by mouth 3 times daily       ranitidine (ZANTAC 75) 75 MG tablet Take 75 mg by mouth 2 times daily.       Suvorexant (BELSOMRA) 5 MG tablet Take 10 mg by mouth nightly as needed       Vitamin D, Cholecalciferol, 1000 units CAPS        fexofenadine (ALLEGRA) 180 MG tablet Take 180 mg by mouth daily.       ibuprofen (ADVIL) 200 MG tablet Take 200 mg by mouth every 4 hours as needed.       zolpidem (AMBIEN) 5 MG tablet Take 5 mg by mouth nightly as needed.         Allergies   Allergen Reactions     Doxycycline Diarrhea     Erythromycin Diarrhea     Hydrocodone Nausea and Vomiting     Hydrocodone-Acetaminophen      Other reaction(s): Vomiting     Lactose Diarrhea     Oxycodone Hcl Nausea and Vomiting     Oxycodone-Acetaminophen      Other reaction(s): Vomiting     Penicillin G Procaine Diarrhea     Penicillins Diarrhea     Tramadol Nausea and Vomiting     Other reaction(s): Vomiting       Review of Systems:  -Constitutional: Otherwise feeling well today, in usual state of health.  -HEENT: Patient denies nonhealing oral sores.  -Skin: As above in HPI. No additional skin concerns.    Physical exam:  Vitals: /83 (BP Location: Right arm, Patient Position: Sitting, Cuff Size: Adult Regular)   Pulse 76   GEN: This is a well developed, well-nourished female in no acute distress, in a pleasant mood.    SKIN: Full skin, which includes the head/face, both arms, chest, back, abdomen,both legs, genitalia and/or groin buttocks, digits and/or nails, was examined.  -Anthony skin type: III/IV  -There are dome shaped bright red papules on the trunk and  extremities.   Multiple regular brown pigmented macules and papules are identified on the trunk and extremities.   -No other lesions of concern on areas examined.       Impression/Plan:  1. Multiple banal-appearing melanocytic nevi  - Discussed the natural etiology and provided reassurances about the benign nature of the lesions.   -Good candidate for mole mapping. Pt will follow up with Dr Chavez    2. Cherry angiomata  - Discussed the natural etiology and provided reassurances about the benign nature of the lesions. We discussed the risks, benefits, and efficacy of laser treatment  -the patent will follow-up with Dr Alcala or Dr Chavez in cosmetics for further evaluation and management.        Follow-up in 1 year, earlier for new or changing lesions.       Staff Involved:  Scribe/Staff    Scribe Disclosure  I, Dominic Najjar, am serving as a scribe to document services personally performed by Dr. Elbert Johnson MD, based on data collection and the provider's statements to me.    Provider Disclosure:   The documentation recorded by the scribe accurately reflects the services I personally performed and the decisions made by me.    Elbert Johnson MD   of Dermatology  Department of Dermatology  [facility]

## 2020-11-22 ENCOUNTER — HEALTH MAINTENANCE LETTER (OUTPATIENT)
Age: 55
End: 2020-11-22

## 2021-09-19 ENCOUNTER — HEALTH MAINTENANCE LETTER (OUTPATIENT)
Age: 56
End: 2021-09-19

## 2022-01-09 ENCOUNTER — HEALTH MAINTENANCE LETTER (OUTPATIENT)
Age: 57
End: 2022-01-09

## 2022-11-20 ENCOUNTER — HEALTH MAINTENANCE LETTER (OUTPATIENT)
Age: 57
End: 2022-11-20

## 2023-04-15 ENCOUNTER — HEALTH MAINTENANCE LETTER (OUTPATIENT)
Age: 58
End: 2023-04-15
